# Patient Record
Sex: FEMALE | URBAN - METROPOLITAN AREA
[De-identification: names, ages, dates, MRNs, and addresses within clinical notes are randomized per-mention and may not be internally consistent; named-entity substitution may affect disease eponyms.]

---

## 2017-08-21 ENCOUNTER — APPOINTMENT (RX ONLY)
Dept: URBAN - METROPOLITAN AREA CLINIC 77 | Facility: CLINIC | Age: 27
Setting detail: DERMATOLOGY
End: 2017-08-21

## 2017-08-21 DIAGNOSIS — L81.1 CHLOASMA: ICD-10-CM

## 2017-08-21 DIAGNOSIS — L70.0 ACNE VULGARIS: ICD-10-CM

## 2017-08-21 DIAGNOSIS — D22 MELANOCYTIC NEVI: ICD-10-CM

## 2017-08-21 PROBLEM — D22.9 MELANOCYTIC NEVI, UNSPECIFIED: Status: ACTIVE | Noted: 2017-08-21

## 2017-08-21 PROCEDURE — ? COUNSELING

## 2017-08-21 PROCEDURE — ? TREATMENT REGIMEN

## 2017-08-21 PROCEDURE — 99203 OFFICE O/P NEW LOW 30 MIN: CPT

## 2017-08-21 ASSESSMENT — LOCATION SIMPLE DESCRIPTION DERM
LOCATION SIMPLE: RIGHT FOREHEAD
LOCATION SIMPLE: RIGHT CHEEK
LOCATION SIMPLE: LEFT CHEEK

## 2017-08-21 ASSESSMENT — LOCATION DETAILED DESCRIPTION DERM
LOCATION DETAILED: LEFT MEDIAL MALAR CHEEK
LOCATION DETAILED: RIGHT MEDIAL MALAR CHEEK
LOCATION DETAILED: RIGHT FOREHEAD

## 2017-08-21 ASSESSMENT — LOCATION ZONE DERM: LOCATION ZONE: FACE

## 2017-08-21 NOTE — PROCEDURE: TREATMENT REGIMEN
Detail Level: Zone
Plan: Location: Forehead\\nTreatment : Tretinoin .05%/Hq4 % compound cream\\n                    Recommended Sunscreen with zinc oxide and/or titanium, patient has a sunscreen powder from previous dermatologist with a heavy amount of zinc oxide AND titanium\\n\\nPatient states that there has been no change to her birth control but has been under a lot stress.\\nDiscussed that stress can be a trigger factor for the pigment change\\nPatient is very good with wearing a sunscreen daily, will treat with Tretinoin/HQ for a month and afterward will refer patient to [facility] for laser treatment.